# Patient Record
(demographics unavailable — no encounter records)

---

## 2024-12-18 NOTE — HISTORY OF PRESENT ILLNESS
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 Has had back pain for several years ago, recently exacerbated. Has tried PT, Gabapentin, HEP, massage therapy.    Current treatment: Gabapentin (actively weaning)   Location: L>R lower back, into the hips and left buttock Quality: Frequent, ache, burn Numbness/tingling: No Weakness: Denies   Bowel/bladder dysfunction: Denies   Prior injections: Denies   Prior Treatments:  PT, Gabapentin, HEP, Massage therapy   Pertinent Surgical History: Denies   Anticoagulation: Denies     Patient denies current infection, fevers, or chills. Physician Disclaimer: I have personally reviewed and confirmed all HPI data with the patient. [] : no [FreeTextEntry7] : mid back -hip  [de-identified] : Mri

## 2024-12-18 NOTE — DATA REVIEWED
[FreeTextEntry1] :  MRI images personally reviewed and reviewed with patient. 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 Additional small central disc herniation at L5-S1. Multilevel mild neuroforaminal narrowing.

## 2024-12-18 NOTE — PHYSICAL EXAM
[de-identified] : General: Appears well nourished and well developed, no acute distress Musculoskeletal: Gait is non-antalgic, patient is ambulating without assistance Lumbar Spine Exam:                        Inspection:    erythema (-)   ecchymosis (-)   rashes (-)                          Palpation: Palpation/percussion at the midline lumbar levels reveals no tenderness                       ROM:    ROM - full range of motion with mild stiffness                            Strength Testin/5 in the bilateral lower extremities                      Reflexes: 2/2 in the bilateral lower extremities                      Sensation: Sensation to light touch grossly intact in the bilateral lower extremities                      Special Tests:  SLR: R (-) ; L (-), Facet loading: R (-) ; L (-) Benjamin's Test: R (-); L (+) Fercho Finger test: R (-); L (+) Gaenslen's test: R (-); L (+) Compression test: R (-); L (+)

## 2024-12-18 NOTE — ASSESSMENT
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  A discussion regarding available pain management treatment options occurred with the patient.  These included interventional, rehabilitative, pharmacological, and alternative modalities. We will proceed with the following:   Interventional treatment options: - Proceed with BL L5-S1 TFESI with fluoroscopic guidance - If inadequate relief would likely consider ILESI vs Caudal (being mindful of EDS) VS SIJ - see additional instructions below      Rehabilitative options: - participation in active HEP was discussed and instructions provided   Medication based treatment options: - None   Complementary treatment options: - Weight management and lifestyle modifications discussed    Additional treatment recommendations as follows: - patient to follow up with Dr. Coyle - RTC after RUBEN  Patient is presenting with acute/sub-acute radicular pain with impairment in ADLs and functionality. The pain has not responded to conservative care including NSAID therapy, OTC analgesics and/or physical therapy >6 weeks in last 3 months.  The risks, benefits and alternatives of the proposed procedure were explained in detail with the patient. The risks outlined include but are not limited to infection, bleeding, post- dural puncture headache, nerve injury, a temporary increase in pain, failure to resolve symptoms, need for future interventions, allergic reaction, and possible elevation of blood sugar in diabetics if using corticosteroid.  All questions were answered to patient's apparent satisfaction, and he/she verbalized an understanding.

## 2024-12-18 NOTE — PHYSICAL EXAM
[de-identified] : General: Appears well nourished and well developed, no acute distress Musculoskeletal: Gait is non-antalgic, patient is ambulating without assistance Lumbar Spine Exam:                        Inspection:    erythema (-)   ecchymosis (-)   rashes (-)                          Palpation: Palpation/percussion at the midline lumbar levels reveals no tenderness                       ROM:    ROM - full range of motion with mild stiffness                            Strength Testin/5 in the bilateral lower extremities                      Reflexes: 2/2 in the bilateral lower extremities                      Sensation: Sensation to light touch grossly intact in the bilateral lower extremities                      Special Tests:  SLR: R (-) ; L (-), Facet loading: R (-) ; L (-) Benjamin's Test: R (-); L (+) Fercho Finger test: R (-); L (+) Gaenslen's test: R (-); L (+) Compression test: R (-); L (+)

## 2024-12-18 NOTE — HISTORY OF PRESENT ILLNESS
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 Has had back pain for several years ago, recently exacerbated. Has tried PT, Gabapentin, HEP, massage therapy.    Current treatment: Gabapentin (actively weaning)   Location: L>R lower back, into the hips and left buttock Quality: Frequent, ache, burn Numbness/tingling: No Weakness: Denies   Bowel/bladder dysfunction: Denies   Prior injections: Denies   Prior Treatments:  PT, Gabapentin, HEP, Massage therapy   Pertinent Surgical History: Denies   Anticoagulation: Denies     Patient denies current infection, fevers, or chills. Physician Disclaimer: I have personally reviewed and confirmed all HPI data with the patient. [] : no [FreeTextEntry7] : mid back -hip  [de-identified] : Mri

## 2024-12-19 NOTE — PROCEDURE
[FreeTextEntry3] : Date of Service: 12/19/2024     Account: [id number]    Patient: ALEJANDRO YOUNGBLOOD     YOB: 1982    Age: 42 year    Surgeon:      Pelon Garcia DO    Assistant:    None    Pre-Operative Diagnosis:         Lumbar Radiculopathy    Post Operative Diagnosis:       Lumbar Radiculopathy    Procedure:             Bilateral L5-S1 transforaminal epidural steroid injection under fluoroscopic guidance    Anesthesia: None      Procedure Detail: Patient was seen in the preoperative area and a detailed discussion about the risks (including potential neurovascular injury leading to paralysis and the differences between particulate and non-particulate steroid medications), benefits and alternatives was carried out.  All related questions were satisfactorily answered and an informed consent was signed.  Procedure site was marked and patient was taken to the fluoroscopy suite and placed in the prone position on the fluoroscopic table. Monitors were applied and vital signs were recorded.  Anesthesia was carried out as mentioned above.   A timeout was performed with all essential staff present and the site and side were verified.   The respective interspace was located using fluoroscopic guidance.  The fluoroscopy beam was adjusted until the respective endplates were sharply aligned.  Oblique fluoroscopy projection was utilized so that the L5-1 foramen was visualized.  The target was the area was just inferior to the pedicle of the respective level mentioned above (safe triangle).  The area was prepped with Chloraprep in a circumferential manner and draped in sterile aseptic fashion.  The skin overlying the target point was infiltrated with 3-4 ml of 1% lidocaine using a 25 gauge needle.   A 22-gauge spinal needle was placed inferior to right L5 pedicle till it contacted os. The needle was slowly walked off under lateral fluoroscopy to position the needle tip in the posterior portion of the foramen.  There was no cerebrospinal fluid or blood on aspiration.  Then, 0.5-1 ml of Omnipaque 240 mgI/ml contrast agent was injected with visualization of nerve root sleeve on AP fluoroscopic view, and retrograde epidural flow.   At this point, a solution containing 0.25 ml of 0.9% preservative free normal saline with 7.5mg of Dexamethasone was injected onto the nerve root sleeve and into the epidural space. Procedure was done on the left side using same technique of needle placement, confirmation with contrast and similar volume and type of medication was injected.  Needles were flushed and removed.  Patient tolerated the procedure well.   Vital signs remained normal throughout the procedure. Band aid(s) were placed over the injection site(s). There were no immediate complications from the performed procedure.  The patient was instructed to apply ice over the injection sites for twenty minutes every two hours for the next 24 hours.    Disposition:         1. The patient was advised to F/U in 1-2 weeks to assess the response to the injection.       2. The patient was also instructed to contact me immediately if there were any concerns related to the procedure performed.

## 2025-01-17 NOTE — PHYSICAL EXAM
[de-identified] : General: Appears well nourished and well developed, no acute distress Musculoskeletal: Gait is non-antalgic, patient is ambulating without assistance Lumbar Spine Exam:                        Inspection:    erythema (-)   ecchymosis (-)   rashes (-)                          Palpation: Palpation/percussion at the midline lumbar levels reveals no tenderness                       ROM:    ROM - full range of motion with mild stiffness                            Strength Testin/5 in the bilateral lower extremities                      Reflexes: 2/2 in the bilateral lower extremities                      Sensation: Sensation to light touch grossly intact in the bilateral lower extremities                      Special Tests:  SLR: R (-) ; L (-), Facet loading: R (-) ; L (-) Benjamin's Test: R (-); L (+) Fercho Finger test: R (-); L (+) Gaenslen's test: R (-); L (+) Compression test: R (-); L (+)

## 2025-01-17 NOTE — ASSESSMENT
[FreeTextEntry1] : Caudal vs SIJ?  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  A discussion regarding available pain management treatment options occurred with the patient.  These included interventional, rehabilitative, pharmacological, and alternative modalities. We will proceed with the following:   Interventional treatment options: - Proceed with BL L5-S1 TFESI with fluoroscopic guidance - If inadequate relief would likely consider ILESI vs Caudal (being mindful of EDS) VS SIJ - see additional instructions below      Rehabilitative options: - participation in active HEP was discussed and instructions provided   Medication based treatment options: - None   Complementary treatment options: - Weight management and lifestyle modifications discussed    Additional treatment recommendations as follows: - patient to follow up with Dr. Coyle - RTC after RUBEN  Patient is presenting with acute/sub-acute radicular pain with impairment in ADLs and functionality. The pain has not responded to conservative care including NSAID therapy, OTC analgesics and/or physical therapy >6 weeks in last 3 months.  The risks, benefits and alternatives of the proposed procedure were explained in detail with the patient. The risks outlined include but are not limited to infection, bleeding, post- dural puncture headache, nerve injury, a temporary increase in pain, failure to resolve symptoms, need for future interventions, allergic reaction, and possible elevation of blood sugar in diabetics if using corticosteroid.  All questions were answered to patient's apparent satisfaction, and he/she verbalized an understanding.

## 2025-01-17 NOTE — HISTORY OF PRESENT ILLNESS
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   Last seen 12/19 for BL L5-S1 TFESI.   Current treatment: Gabapentin (actively weaning)   Location: L>R lower back, into the hips and left buttock Quality: Frequent, ache, burn Numbness/tingling: No Weakness: Denies   Bowel/bladder dysfunction: Denies   Prior injections:  BL L5-S1 TFESI 12/19/24   Prior Treatments:  PT, Gabapentin, HEP, Massage therapy   Pertinent Surgical History: Denies   Anticoagulation: Denies     Patient denies current infection, fevers, or chills. Physician Disclaimer: I have personally reviewed and confirmed all HPI data with the patient. [] : no [FreeTextEntry7] : mid back -hip  [de-identified] : Mri

## 2025-04-03 NOTE — HISTORY OF PRESENT ILLNESS
[de-identified] : INTERMITTENT RIGHT SIDED CLEAR RHINORHEA X 2 YEARS HEADACHES SOMETIME NO HEAD TRAUMA EHLER [facility] [facility] HX REVIEWED

## 2025-04-03 NOTE — REVIEW OF SYSTEMS
[Sneezing] : sneezing [Seasonal Allergies] : seasonal allergies [Post Nasal Drip] : post nasal drip [Ear Pain] : ear pain [Ear Itch] : ear itch [Dizziness] : dizziness [Lightheadedness] : lightheadedness [Ear Drainage] : ear drainage [Ear Noises] : ear noises [Nasal Congestion] : nasal congestion [Recurrent Sinus Infections] : recurrent sinus infections [Sinus Pain] : sinus pain [Sinus Pressure] : sinus pressure [Hoarseness] : hoarseness [Throat Pain] : throat pain [Joint Pain] : joint pain [Anxiety] : anxiety [Depression] : depression [Easy Bruising] : a tendency for easy bruising [Negative] : Endocrine [Patient Intake Form Reviewed] : Patient intake form was reviewed [FreeTextEntry9] : muscle ache [de-identified] : headache  [de-identified] : sweating at night

## 2025-04-03 NOTE — ASSESSMENT
[FreeTextEntry1] : ALLERGY DISCUSSED BETA 2 TRANSFERRIN IMAGE STUDIES TO BE REVIEWED WHEN AVAILABLE F/U AFTER ABOVE